# Patient Record
Sex: FEMALE | Race: WHITE | NOT HISPANIC OR LATINO | Employment: OTHER | URBAN - METROPOLITAN AREA
[De-identification: names, ages, dates, MRNs, and addresses within clinical notes are randomized per-mention and may not be internally consistent; named-entity substitution may affect disease eponyms.]

---

## 2018-05-14 ENCOUNTER — OFFICE VISIT CONVERTED (OUTPATIENT)
Dept: SURGERY | Facility: CLINIC | Age: 73
End: 2018-05-14
Attending: PHYSICIAN ASSISTANT

## 2018-05-14 ENCOUNTER — CONVERSION ENCOUNTER (OUTPATIENT)
Dept: SURGERY | Facility: CLINIC | Age: 73
End: 2018-05-14

## 2019-05-13 ENCOUNTER — HOSPITAL ENCOUNTER (OUTPATIENT)
Dept: GENERAL RADIOLOGY | Facility: HOSPITAL | Age: 74
Discharge: HOME | End: 2019-05-13
Attending: FAMILY MEDICINE

## 2019-07-24 ENCOUNTER — OFFICE VISIT CONVERTED (OUTPATIENT)
Dept: ORTHOPEDIC SURGERY | Facility: CLINIC | Age: 74
End: 2019-07-24
Attending: ORTHOPAEDIC SURGERY

## 2020-01-24 ENCOUNTER — HOSPITAL ENCOUNTER (OUTPATIENT)
Dept: URGENT CARE | Facility: CLINIC | Age: 75
Discharge: HOME | End: 2020-01-24
Attending: NURSE PRACTITIONER

## 2021-05-15 VITALS — BODY MASS INDEX: 34.96 KG/M2 | OXYGEN SATURATION: 96 % | WEIGHT: 217.5 LBS | HEART RATE: 94 BPM | HEIGHT: 66 IN

## 2021-05-16 VITALS — BODY MASS INDEX: 35.92 KG/M2 | RESPIRATION RATE: 16 BRPM | WEIGHT: 223.5 LBS | HEIGHT: 66 IN

## 2021-08-18 ENCOUNTER — OFFICE VISIT (OUTPATIENT)
Dept: NEUROSURGERY | Facility: CLINIC | Age: 76
End: 2021-08-18

## 2021-08-18 VITALS
DIASTOLIC BLOOD PRESSURE: 79 MMHG | BODY MASS INDEX: 33.75 KG/M2 | WEIGHT: 210 LBS | HEART RATE: 73 BPM | SYSTOLIC BLOOD PRESSURE: 128 MMHG | HEIGHT: 66 IN

## 2021-08-18 DIAGNOSIS — M48.061 SPINAL STENOSIS, LUMBAR REGION, WITHOUT NEUROGENIC CLAUDICATION: ICD-10-CM

## 2021-08-18 DIAGNOSIS — M43.10 ACQUIRED SPONDYLOLISTHESIS: ICD-10-CM

## 2021-08-18 DIAGNOSIS — M47.817 SPONDYLOSIS OF LUMBOSACRAL REGION WITHOUT MYELOPATHY OR RADICULOPATHY: Primary | ICD-10-CM

## 2021-08-18 PROCEDURE — 99215 OFFICE O/P EST HI 40 MIN: CPT | Performed by: NURSE PRACTITIONER

## 2021-08-18 RX ORDER — MELOXICAM 15 MG/1
15 TABLET ORAL DAILY
Qty: 30 TABLET | Refills: 1 | Status: SHIPPED | OUTPATIENT
Start: 2021-08-18 | End: 2022-08-06

## 2021-08-18 RX ORDER — ESCITALOPRAM OXALATE 20 MG/1
TABLET ORAL
COMMUNITY
Start: 2021-06-11

## 2021-08-18 RX ORDER — LEVOTHYROXINE SODIUM 137 UG/1
TABLET ORAL
COMMUNITY
End: 2022-08-06

## 2021-08-18 RX ORDER — ESOMEPRAZOLE MAGNESIUM 40 MG/1
CAPSULE, DELAYED RELEASE ORAL
COMMUNITY
Start: 2021-06-11

## 2021-08-18 RX ORDER — ASCORBIC ACID 500 MG
TABLET ORAL
COMMUNITY
End: 2022-08-06

## 2021-08-18 RX ORDER — CITALOPRAM 10 MG/1
TABLET ORAL
COMMUNITY
End: 2022-08-06

## 2021-08-18 RX ORDER — MULTIVIT WITH MINERALS/LUTEIN
TABLET ORAL
COMMUNITY
End: 2022-08-06

## 2021-08-18 RX ORDER — LISINOPRIL 5 MG/1
TABLET ORAL
COMMUNITY
Start: 2021-06-11 | End: 2022-09-19 | Stop reason: ALTCHOICE

## 2021-08-18 NOTE — PROGRESS NOTES
"Chief Complaint  Back Pain    Subjective          Celina Randolph who is a 76 y.o. year old female who presents to [facility] NEUROLOGY & NEUROSURGERY for evaluation of her low back pain.    Pt with concerns of low back pain for at least a year. Pain is primarily in the lumbar spine, severe at times. She denies any radiating pain into the legs. Her pain increases with prolonged standing and walking. Resting and bending at the waist relieves her pain. She denies any weakness or numbness in the legs. She denies any problems with bowel or bladder.     She has seen physical therapy and chiropractic, which did not provide much benefit. She is taking Tylenol and a muscle relaxant, which provide modest benefit. She has not seen pain management for any type of medication management or injections.     Her last MRI Lumbar Spine was in October 2020, shows multilevel spondylosis with moderately severe canal stenosis at L4/5.        Recent Interventions: Physical therapy (no benefit), chiropractic (no benefit)      Review of Systems   Musculoskeletal: Positive for arthralgias and back pain.   All other systems reviewed and are negative.       Objective   Vital Signs:   /79   Pulse 73   Ht 167.6 cm (66\")   Wt 95.3 kg (210 lb)   BMI 33.89 kg/m²       Physical Exam  Vitals reviewed.   Constitutional:       Appearance: Normal appearance.   Musculoskeletal:      Lumbar back: Tenderness present. Negative right straight leg raise test and negative left straight leg raise test.      Right hip: No tenderness.      Left hip: No tenderness.   Neurological:      Mental Status: She is alert and oriented to person, place, and time.      Deep Tendon Reflexes: Strength normal.      Reflex Scores:       Patellar reflexes are 1+ on the right side and 1+ on the left side.       Achilles reflexes are 1+ on the right side and 1+ on the left side.       Neurologic Exam     Mental Status   Oriented to person, place, " and time.   Level of consciousness: alert    Motor Exam   Muscle bulk: normal  Overall muscle tone: normal    Strength   Strength 5/5 throughout.     Sensory Exam   Light touch normal.     Gait, Coordination, and Reflexes     Reflexes   Right patellar: 1+  Left patellar: 1+  Right achilles: 1+  Left achilles: 1+       Result Review :       Data reviewed: Radiologic studies personally reviewed MRI Lumbar Spine was in October 2020, shows multilevel spondylosis with moderately severe canal stenosis at L4/5.          Assessment and Plan    Diagnoses and all orders for this visit:    1. Spondylosis of lumbosacral region without myelopathy or radiculopathy (Primary)  -     MRI Lumbar Spine Without Contrast; Future  -     meloxicam (MOBIC) 15 MG tablet; Take 1 tablet by mouth Daily. Take with food.  Dispense: 30 tablet; Refill: 1    2. Spinal stenosis, lumbar region, without neurogenic claudication  -     MRI Lumbar Spine Without Contrast; Future    3. Acquired spondylolisthesis  -     MRI Lumbar Spine Without Contrast; Future    Pt with severe low back pain, worsening over the past year. We reviewed her MRI from a year ago, showing marked stenosis at L4/5 with degenerative changes throughout the spine. She is interested in surgical options. We discussed consideration for back pain would be lumbar fusion, with risks involved due to her age and multilevel spondylosis. We will repeat her MRI Lumbar Spine to evaluate progression for consideration of surgical options. We discussed median nerve blocks and RFA trial. For her pain, we will start Meloxicam 15 mg daily for her pain. She will follow up in 6 weeks to review her MRI.     I spent 50 minutes caring for Celina on this date of service. This time includes time spent by me in the following activities:preparing for the visit, reviewing tests, obtaining and/or reviewing a separately obtained history, performing a medically appropriate examination and/or evaluation ,  counseling and educating the patient/family/caregiver, ordering medications, tests, or procedures, documenting information in the medical record, independently interpreting results and communicating that information with the patient/family/caregiver and care coordination.    Follow Up   Return in about 6 weeks (around 9/29/2021).  Patient was given instructions and counseling regarding her condition or for health maintenance advice.     -MRI Lumbar Spine  -Meloxicam 15 mg daily  -Follow up in 6 weeks on Tuesday or Thursday

## 2021-09-30 ENCOUNTER — OFFICE VISIT (OUTPATIENT)
Dept: NEUROSURGERY | Facility: CLINIC | Age: 76
End: 2021-09-30

## 2021-09-30 ENCOUNTER — LAB (OUTPATIENT)
Dept: LAB | Facility: HOSPITAL | Age: 76
End: 2021-09-30

## 2021-09-30 ENCOUNTER — TRANSCRIBE ORDERS (OUTPATIENT)
Dept: ADMINISTRATIVE | Facility: HOSPITAL | Age: 76
End: 2021-09-30

## 2021-09-30 VITALS — HEIGHT: 66 IN | BODY MASS INDEX: 34.23 KG/M2 | WEIGHT: 213 LBS

## 2021-09-30 DIAGNOSIS — K21.9 CHRONIC GERD: ICD-10-CM

## 2021-09-30 DIAGNOSIS — E78.5 HYPERLIPIDEMIA, UNSPECIFIED HYPERLIPIDEMIA TYPE: ICD-10-CM

## 2021-09-30 DIAGNOSIS — M48.061 SPINAL STENOSIS, LUMBAR REGION, WITHOUT NEUROGENIC CLAUDICATION: ICD-10-CM

## 2021-09-30 DIAGNOSIS — I10 ESSENTIAL HYPERTENSION, MALIGNANT: ICD-10-CM

## 2021-09-30 DIAGNOSIS — F32.A DEPRESSIVE DISORDER: Primary | ICD-10-CM

## 2021-09-30 DIAGNOSIS — E03.9 HYPOTHYROIDISM, UNSPECIFIED TYPE: ICD-10-CM

## 2021-09-30 DIAGNOSIS — F32.A DEPRESSIVE DISORDER: ICD-10-CM

## 2021-09-30 DIAGNOSIS — M47.817 SPONDYLOSIS OF LUMBOSACRAL REGION WITHOUT MYELOPATHY OR RADICULOPATHY: Primary | ICD-10-CM

## 2021-09-30 LAB
ALBUMIN SERPL-MCNC: 4.3 G/DL (ref 3.5–5.2)
ALP SERPL-CCNC: 76 U/L (ref 39–117)
ALT SERPL W P-5'-P-CCNC: 17 U/L (ref 1–33)
ANION GAP SERPL CALCULATED.3IONS-SCNC: 10.2 MMOL/L (ref 5–15)
ANISOCYTOSIS BLD QL: NORMAL
AST SERPL-CCNC: 19 U/L (ref 1–32)
BASOPHILS # BLD MANUAL: 0.07 10*3/MM3 (ref 0–0.2)
BASOPHILS NFR BLD AUTO: 1 % (ref 0–1.5)
BILIRUB CONJ SERPL-MCNC: <0.2 MG/DL (ref 0–0.3)
BILIRUB INDIRECT SERPL-MCNC: NORMAL MG/DL
BILIRUB SERPL-MCNC: 0.3 MG/DL (ref 0–1.2)
BUN SERPL-MCNC: 15 MG/DL (ref 8–23)
BUN/CREAT SERPL: 17.9 (ref 7–25)
CALCIUM SPEC-SCNC: 9.3 MG/DL (ref 8.6–10.5)
CHLORIDE SERPL-SCNC: 104 MMOL/L (ref 98–107)
CHOLEST SERPL-MCNC: 223 MG/DL (ref 0–200)
CO2 SERPL-SCNC: 26.8 MMOL/L (ref 22–29)
CREAT SERPL-MCNC: 0.84 MG/DL (ref 0.57–1)
DEPRECATED RDW RBC AUTO: 39.3 FL (ref 37–54)
EOSINOPHIL # BLD MANUAL: 0.29 10*3/MM3 (ref 0–0.4)
EOSINOPHIL NFR BLD MANUAL: 4 % (ref 0.3–6.2)
ERYTHROCYTE [DISTWIDTH] IN BLOOD BY AUTOMATED COUNT: 12.2 % (ref 12.3–15.4)
FOLATE SERPL-MCNC: 13.7 NG/ML (ref 4.78–24.2)
GFR SERPL CREATININE-BSD FRML MDRD: 66 ML/MIN/1.73
GLUCOSE SERPL-MCNC: 91 MG/DL (ref 65–99)
HCT VFR BLD AUTO: 39.6 % (ref 34–46.6)
HDLC SERPL-MCNC: 67 MG/DL (ref 40–60)
HGB BLD-MCNC: 13 G/DL (ref 12–15.9)
LDLC SERPL CALC-MCNC: 141 MG/DL (ref 0–100)
LDLC/HDLC SERPL: 2.07 {RATIO}
LYMPHOCYTES # BLD MANUAL: 1.75 10*3/MM3 (ref 0.7–3.1)
LYMPHOCYTES NFR BLD MANUAL: 24.2 % (ref 19.6–45.3)
LYMPHOCYTES NFR BLD MANUAL: 5.1 % (ref 5–12)
MCH RBC QN AUTO: 28.9 PG (ref 26.6–33)
MCHC RBC AUTO-ENTMCNC: 32.8 G/DL (ref 31.5–35.7)
MCV RBC AUTO: 88 FL (ref 79–97)
MICROCYTES BLD QL: NORMAL
MONOCYTES # BLD AUTO: 0.37 10*3/MM3 (ref 0.1–0.9)
NEUTROPHILS # BLD AUTO: 4.75 10*3/MM3 (ref 1.7–7)
NEUTROPHILS NFR BLD MANUAL: 65.7 % (ref 42.7–76)
PLAT MORPH BLD: NORMAL
PLATELET # BLD AUTO: 195 10*3/MM3 (ref 140–450)
PMV BLD AUTO: 9.6 FL (ref 6–12)
POIKILOCYTOSIS BLD QL SMEAR: NORMAL
POLYCHROMASIA BLD QL SMEAR: NORMAL
POTASSIUM SERPL-SCNC: 4.2 MMOL/L (ref 3.5–5.2)
PROT SERPL-MCNC: 6.8 G/DL (ref 6–8.5)
RBC # BLD AUTO: 4.5 10*6/MM3 (ref 3.77–5.28)
SODIUM SERPL-SCNC: 141 MMOL/L (ref 136–145)
T4 FREE SERPL-MCNC: 1.39 NG/DL (ref 0.93–1.7)
TRIGL SERPL-MCNC: 86 MG/DL (ref 0–150)
TSH SERPL DL<=0.05 MIU/L-ACNC: 0.34 UIU/ML (ref 0.27–4.2)
VIT B12 BLD-MCNC: 1063 PG/ML (ref 211–946)
VLDLC SERPL-MCNC: 15 MG/DL (ref 5–40)
WBC # BLD AUTO: 7.23 10*3/MM3 (ref 3.4–10.8)
WBC MORPH BLD: NORMAL

## 2021-09-30 PROCEDURE — 36415 COLL VENOUS BLD VENIPUNCTURE: CPT

## 2021-09-30 PROCEDURE — 82746 ASSAY OF FOLIC ACID SERUM: CPT

## 2021-09-30 PROCEDURE — 80048 BASIC METABOLIC PNL TOTAL CA: CPT

## 2021-09-30 PROCEDURE — 82607 VITAMIN B-12: CPT

## 2021-09-30 PROCEDURE — 84443 ASSAY THYROID STIM HORMONE: CPT

## 2021-09-30 PROCEDURE — 80061 LIPID PANEL: CPT

## 2021-09-30 PROCEDURE — 85007 BL SMEAR W/DIFF WBC COUNT: CPT

## 2021-09-30 PROCEDURE — 87086 URINE CULTURE/COLONY COUNT: CPT

## 2021-09-30 PROCEDURE — 85027 COMPLETE CBC AUTOMATED: CPT

## 2021-09-30 PROCEDURE — 84439 ASSAY OF FREE THYROXINE: CPT

## 2021-09-30 PROCEDURE — 81001 URINALYSIS AUTO W/SCOPE: CPT

## 2021-09-30 PROCEDURE — 80076 HEPATIC FUNCTION PANEL: CPT

## 2021-09-30 PROCEDURE — 99214 OFFICE O/P EST MOD 30 MIN: CPT | Performed by: NURSE PRACTITIONER

## 2021-09-30 RX ORDER — TRAMADOL HYDROCHLORIDE 50 MG/1
50 TABLET ORAL EVERY 8 HOURS PRN
Qty: 45 TABLET | Refills: 0 | Status: SHIPPED | OUTPATIENT
Start: 2021-09-30 | End: 2022-08-06

## 2021-09-30 NOTE — PATIENT INSTRUCTIONS
-Referral to pain management for RFA trial  -Tramadol 50 mg every 8 hours as needed for pain  -Stop meloxicam  -Follow up as needed

## 2021-09-30 NOTE — PROGRESS NOTES
"Chief Complaint  Back Pain    Subjective          Celina Randolph who is a 76 y.o. year old female who presents to [facility] NEUROLOGY & NEUROSURGERY for follow up of her low back pain. Review MRI Lumbar Spine.    MRI Lumbar Spine at [facility]. Overall stable multilevel spondylosis with severe canal stenosis at L4/5.     Pt's pain is primarily in the low back, constant and severe. Increases with prolonged walking and standing. She again denies any radiating pain in the legs. Denies numbness or weakness in the legs. Denies problems with bowel or bladder.     Meloxicam did not help and caused diarrhea.         Interval History 8/18/21  Celina Randolph who is a 76 y.o. year old female who presents to [facility] NEUROLOGY & NEUROSURGERY for evaluation of her low back pain.     Pt with concerns of low back pain for at least a year. Pain is primarily in the lumbar spine, severe at times. She denies any radiating pain into the legs. Her pain increases with prolonged standing and walking. Resting and bending at the waist relieves her pain. She denies any weakness or numbness in the legs. She denies any problems with bowel or bladder.      She has seen physical therapy and chiropractic, which did not provide much benefit. She is taking Tylenol and a muscle relaxant, which provide modest benefit. She has not seen pain management for any type of medication management or injections.      Her last MRI Lumbar Spine was in October 2020, shows multilevel spondylosis with moderately severe canal stenosis at L4/5.     Recent Interventions: Physical therapy and chiropractic. Tylenol and meloxicam.      Review of Systems   Musculoskeletal: Positive for back pain.   All other systems reviewed and are negative.       Objective   Vital Signs:   Ht 167.6 cm (66\")   Wt 96.6 kg (213 lb)   BMI 34.38 kg/m²       Physical Exam  Vitals reviewed.   Constitutional:       Appearance: " Normal appearance.   Musculoskeletal:      Lumbar back: Tenderness present. Negative right straight leg raise test and negative left straight leg raise test.   Neurological:      Mental Status: She is alert and oriented to person, place, and time.      Deep Tendon Reflexes: Strength normal.        Neurologic Exam     Mental Status   Oriented to person, place, and time.   Level of consciousness: alert    Motor Exam   Muscle bulk: normal  Overall muscle tone: normal    Strength   Strength 5/5 throughout.     Gait, Coordination, and Reflexes     Gait  Gait: wide-based       Result Review :{Labs  Result Review  Imaging  Med Tab  Media  Procedures :23}       Data reviewed: Radiologic studies MRI Lumbar Spine at [facility]. Overall stable multilevel spondylosis with severe canal stenosis at L4/5.           Assessment and Plan    Diagnoses and all orders for this visit:    1. Spondylosis of lumbosacral region without myelopathy or radiculopathy (Primary)  -     Ambulatory Referral to Pain Management  -     traMADol (ULTRAM) 50 MG tablet; Take 1 tablet by mouth Every 8 (Eight) Hours As Needed for Moderate Pain  or Severe Pain .  Dispense: 45 tablet; Refill: 0    2. Spinal stenosis, lumbar region, without neurogenic claudication    Pt with worsening low back pain over the past year. She has multilevel spondylosis with severe canal stenosis at L4/5. No evidence of neurogenic claudication. Would not recommend surgery at this time. We discussed red flag symptoms that would warrant further follow up. For her pain I will refer her to pain management for lumbar RFA trial. Start Tramadol 50 mg as needed, which can be continued by pain management if beneficial. She will follow up in our clinic on an as needed basis.       Follow Up   Return if symptoms worsen or fail to improve.  Patient was given instructions and counseling regarding her condition or for health maintenance advice.     -Referral to pain management for  RFA trial  -Tramadol 50 mg every 8 hours as needed for pain  -Stop meloxicam  -Follow up as needed

## 2021-10-01 LAB
BACTERIA SPEC AEROBE CULT: NO GROWTH
BACTERIA UR QL AUTO: NORMAL /HPF
BILIRUB UR QL STRIP: NEGATIVE
CLARITY UR: CLEAR
COD CRY URNS QL: NORMAL /HPF
COLOR UR: YELLOW
GLUCOSE UR STRIP-MCNC: NEGATIVE MG/DL
HGB UR QL STRIP.AUTO: NEGATIVE
HYALINE CASTS UR QL AUTO: NORMAL /LPF
KETONES UR QL STRIP: NEGATIVE
LEUKOCYTE ESTERASE UR QL STRIP.AUTO: ABNORMAL
NITRITE UR QL STRIP: NEGATIVE
PH UR STRIP.AUTO: 6.5 [PH] (ref 5–8)
PROT UR QL STRIP: NEGATIVE
RBC # UR: NORMAL /HPF
REF LAB TEST METHOD: NORMAL
SP GR UR STRIP: 1.02 (ref 1–1.03)
SQUAMOUS #/AREA URNS HPF: NORMAL /HPF
UROBILINOGEN UR QL STRIP: ABNORMAL
WBC UR QL AUTO: NORMAL /HPF

## 2022-01-26 ENCOUNTER — TELEPHONE (OUTPATIENT)
Dept: NEUROSURGERY | Facility: CLINIC | Age: 77
End: 2022-01-26

## 2022-01-26 NOTE — TELEPHONE ENCOUNTER
Caller: JARED  Relationship: REFERRING OFFICE.   Best call back number: [phone] (WILL NOT BE IN OFFICE AFTER 2PM TODAY IF NEED TO CONTACT.)      What was the call regarding:   JARED FROM PATIENT REFERRING OFFICE CALLED THEY WOULD LIKE TO KNOW IF THE PATIENT'S LAST TWO OFFICE NOTES FROM  09/30/21 AND 08/18/21 COULD BE FAXED TO THEIR OFFICE -[phone].    PLEASE FAX LAST TWO OFFICE NOTES -[phone]

## 2022-03-16 ENCOUNTER — OFFICE VISIT (OUTPATIENT)
Dept: NEUROSURGERY | Facility: CLINIC | Age: 77
End: 2022-03-16

## 2022-03-16 VITALS
WEIGHT: 218 LBS | SYSTOLIC BLOOD PRESSURE: 153 MMHG | HEIGHT: 66 IN | HEART RATE: 79 BPM | DIASTOLIC BLOOD PRESSURE: 68 MMHG | BODY MASS INDEX: 35.03 KG/M2

## 2022-03-16 DIAGNOSIS — M48.061 SPINAL STENOSIS, LUMBAR REGION, WITHOUT NEUROGENIC CLAUDICATION: ICD-10-CM

## 2022-03-16 DIAGNOSIS — M47.817 SPONDYLOSIS OF LUMBOSACRAL REGION WITHOUT MYELOPATHY OR RADICULOPATHY: Primary | ICD-10-CM

## 2022-03-16 PROCEDURE — 99214 OFFICE O/P EST MOD 30 MIN: CPT | Performed by: NURSE PRACTITIONER

## 2022-03-16 NOTE — PATIENT INSTRUCTIONS
-MRI Lumbar Spine   -Referral to pain management for medication management  -Follow up in 4 weeks on Tuesday or Thursday

## 2022-03-16 NOTE — PROGRESS NOTES
Chief Complaint  Back Pain    Subjective          Celina Randolph who is a 76 y.o. year old female who presents to [facility] NEUROLOGY & NEUROSURGERY for follow up of low back pain.     Pt with history of lumbar spondylosis and spinal stenosis, presenting with concerns of worsening low back pain. At her last visit she was referred to pain management for medication management and lumbar RFA trial. She was started on Tramadol, which did not provide her much benefit. She received lumbar RFA in January of this year, initially appreciating good improvement in her pain though this wore off after a month. Her low back pain is moderate to severe in intensity. At her last visit with pain management she was started on Norco 5/325 mg once daily. This is not providing benefit. Her PCP started low dose Gabapentin 100 mg TID and duloxetine. She is unsure these medications are providing benefit.     Her pain is primarily in the low back. She denies any radiating pain into the legs. Denies weakness into the legs. Denies problems with bowel or bladder.       Interval History 9/30/21  Celina Randolph who is a 76 y.o. year old female who presents to [facility] NEUROLOGY & NEUROSURGERY for follow up of her low back pain. Review MRI Lumbar Spine.     MRI Lumbar Spine at [facility]. Overall stable multilevel spondylosis with severe canal stenosis at L4/5.      Pt's pain is primarily in the low back, constant and severe. Increases with prolonged walking and standing. She again denies any radiating pain in the legs. Denies numbness or weakness in the legs. Denies problems with bowel or bladder.      Meloxicam did not help and caused diarrhea.     Recent Interventions: Lumbar RFA. Norco, Tramadol, Gabapentin, duloxetine, Mobic.       Review of Systems   Musculoskeletal: Positive for arthralgias and back pain.   All other systems reviewed and are negative.       Objective   Vital  "Signs:   /68   Pulse 79   Ht 167.6 cm (66\")   Wt 98.9 kg (218 lb)   BMI 35.19 kg/m²       Physical Exam  Vitals reviewed.   Constitutional:       Appearance: Normal appearance.   Musculoskeletal:      Lumbar back: Tenderness present. Negative right straight leg raise test and negative left straight leg raise test.   Neurological:      Mental Status: She is alert and oriented to person, place, and time.      Deep Tendon Reflexes: Strength normal.      Reflex Scores:       Patellar reflexes are 1+ on the right side and 1+ on the left side.       Achilles reflexes are 1+ on the right side and 1+ on the left side.       Neurologic Exam     Mental Status   Oriented to person, place, and time.   Level of consciousness: alert    Motor Exam   Muscle bulk: normal  Overall muscle tone: normal    Strength   Strength 5/5 throughout.     Sensory Exam   Light touch normal.     Gait, Coordination, and Reflexes     Gait  Gait: wide-based    Reflexes   Right patellar: 1+  Left patellar: 1+  Right achilles: 1+  Left achilles: 1+       Result Review :       Data reviewed: Radiologic studies MRI Lumbar Spine at [facility] in August 2021 personally reviewed, revealing multilevel spondylosis with severe stenosis at L4/5, moderate stenosis at L3/4.           Assessment and Plan    Diagnoses and all orders for this visit:    1. Spondylosis of lumbosacral region without myelopathy or radiculopathy (Primary)  -     Cancel: MRI Lumbar Spine Without Contrast; Future  -     MRI Lumbar Spine Without Contrast; Future  -     Ambulatory Referral to Pain Management    2. Spinal stenosis, lumbar region, without neurogenic claudication  -     Cancel: MRI Lumbar Spine Without Contrast; Future  -     MRI Lumbar Spine Without Contrast; Future  -     Ambulatory Referral to Pain Management    Pt presenting with worsening low back pain. She has failed conservative therapies including PT, medication management, and most recently lumbar RFA. " We have discussed previously that with her pain primarily in the back surgery would likely not provide much improvement in her pain. She wishes to discuss surgical options with Dr. Moss. Will repeat MRI Lumbar Spine and follow up in 6 weeks for surgical evaluation. We did discuss consideration for spinal cord stimulator. She is requesting medication for pain today. We discussed that she is followed by pain management and we would not manage her medications. She requests a referral to a different pain management specialist for medication management.       Follow Up   Return in about 4 weeks (around 4/13/2022).  Patient was given instructions and counseling regarding her condition or for health maintenance advice.     -MRI Lumbar Spine   -Referral to pain management for medication management  -Follow up in 4 weeks on Tuesday or Thursday

## 2022-03-25 ENCOUNTER — TELEPHONE (OUTPATIENT)
Dept: NEUROLOGY | Facility: CLINIC | Age: 77
End: 2022-03-25

## 2022-03-25 NOTE — TELEPHONE ENCOUNTER
Caller: NANCIE    Relationship: [facility]     What orders are you requesting (i.e. lab or imaging): MRI LUMBAR SPINE WO CONTRAST     NANCIE WITH [facility] IS REQUESTING IM ORDER BE FAXED TO HER BEFORE EOD. PATIENT IS SCHEDULED FOR Monday 3/28/22.     FAX # [phone]    THANK YOU.

## 2022-08-06 PROCEDURE — U0005 INFEC AGEN DETEC AMPLI PROBE: HCPCS | Performed by: FAMILY MEDICINE

## 2022-08-06 PROCEDURE — U0004 COV-19 TEST NON-CDC HGH THRU: HCPCS | Performed by: FAMILY MEDICINE

## 2022-08-08 ENCOUNTER — TELEPHONE (OUTPATIENT)
Dept: URGENT CARE | Facility: CLINIC | Age: 77
End: 2022-08-08

## 2022-08-09 ENCOUNTER — TELEPHONE (OUTPATIENT)
Dept: URGENT CARE | Facility: CLINIC | Age: 77
End: 2022-08-09

## 2022-09-19 ENCOUNTER — OFFICE VISIT (OUTPATIENT)
Dept: PULMONOLOGY | Facility: CLINIC | Age: 77
End: 2022-09-19

## 2022-09-19 VITALS
RESPIRATION RATE: 16 BRPM | BODY MASS INDEX: 49.48 KG/M2 | DIASTOLIC BLOOD PRESSURE: 72 MMHG | TEMPERATURE: 98 F | HEART RATE: 79 BPM | HEIGHT: 55 IN | SYSTOLIC BLOOD PRESSURE: 144 MMHG | WEIGHT: 213.8 LBS | OXYGEN SATURATION: 96 %

## 2022-09-19 DIAGNOSIS — R09.82 POST-NASAL DRIP: ICD-10-CM

## 2022-09-19 DIAGNOSIS — R05.3 CHRONIC COUGH: Primary | ICD-10-CM

## 2022-09-19 PROCEDURE — 99204 OFFICE O/P NEW MOD 45 MIN: CPT | Performed by: INTERNAL MEDICINE

## 2022-09-19 RX ORDER — ALBUTEROL SULFATE 90 UG/1
2 AEROSOL, METERED RESPIRATORY (INHALATION) EVERY 4 HOURS PRN
Qty: 1 G | Refills: 5 | Status: SHIPPED | OUTPATIENT
Start: 2022-09-19

## 2022-09-19 RX ORDER — AZELASTINE HCL 205.5 UG/1
2 SPRAY NASAL 2 TIMES DAILY
Qty: 1 EACH | Refills: 6 | Status: SHIPPED | OUTPATIENT
Start: 2022-09-19

## 2022-09-19 RX ORDER — FEXOFENADINE HYDROCHLORIDE 180 MG/1
TABLET, FILM COATED ORAL
COMMUNITY
Start: 2022-07-26

## 2022-09-19 RX ORDER — MELOXICAM 7.5 MG/1
TABLET ORAL
COMMUNITY
Start: 2022-07-26

## 2022-09-19 RX ORDER — THYROID,PORK 15 MG
TABLET ORAL
COMMUNITY
Start: 2022-07-29

## 2022-09-19 NOTE — PROGRESS NOTES
CHIEF COMPLAINT    Primary Care Provider  Obie Del Cid DO     Referring Provider  No ref. provider found    Patient Complaint  Establish Care (New pt here for chronic cough ), Cough, and Wheezing        Subjective          Celina Randolph presents to [facility] PULMONARY & CRITICAL CARE MEDICINE  History of Present Illness  Celina Randolph is a 77 y.o. female who has chronic cough.  She was referred to me by primary care provider.  She has been having cough for several years, has usually dry cough.  She has been having intermittent mucoid phlegm production.  Usually she has coughing fits at unknown times.  She also has postnasal drip and has been having issues trying to clear her throat at times.  She never had work-up including pulmonary function test and 6-minute walk test.  Never had chest x-ray.  She was recently diagnosed with COVID-19 infection and her cough was worse since.  She has been on lisinopril at home and feels like with her it has caused a cough.  She has been on it for more than 4 years.  She has no changes in weight or appetite.  She is a lifelong non-smoker.  However she had secondhand smoke exposure from her  who recently passed away.  No history of asthma.  No history of chronic lung disease in family.  No changes in weight or appetite.  She has been given Tussionex which does help her with cough at night but makes her drowsy.  At times, she has cough so bad where she almost passes out.  She at times cannot speak with persistent cough.  Her  used to tease her that she is not able to speak with profound cough.  She has not been on any inhalers.    Tobacco Use: Low Risk    • Smoking Tobacco Use: Never Smoker   • Smokeless Tobacco Use: Never Used   .    Review of Systems     General:  No Fatigue, No Fever No weight loss or loss of appetite  HEENT: No dysphagia, No Visual Changes, no rhinorrhea  Respiratory:  + cough,+Dyspnea, minimal intermittent  phlegm, No Pleuritic Pain, no wheezing, no hemoptysis.  Cardiovascular: Denies chest pain, denies palpitations,+JACOBSON, No Chest Pressure  Gastrointestinal:  No Abdominal Pain, No Nausea, No Vomiting, No Diarrhea  Genitourinary:  No Dysuria, No Frequency, No Hesitancy  Musculoskeletal: No muscle pain or swelling  Endocrine:  No Heat Intolerance, No Cold Intolerance  Hematologic:  No Bleeding, No Bruising  Psychiatric:  No Anxiety, No Depression  Neurologic:  No Confusion, no Dysarthria, No Headaches  Skin:  No Rash, No Open Wounds    History reviewed. No pertinent family history.     Social History     Socioeconomic History   • Marital status:    Tobacco Use   • Smoking status: Never Smoker   • Smokeless tobacco: Never Used   Vaping Use   • Vaping Use: Never used   Substance and Sexual Activity   • Alcohol use: Never   • Drug use: Never   • Sexual activity: Defer        Past Medical History:   Diagnosis Date   • Headache    • Hypertension    • Low back pain         Immunization History   Administered Date(s) Administered   • COVID-19 (Chideo) 03/09/2021         Allergies   Allergen Reactions   • Cephalexin Other (See Comments), Itching, Rash and Swelling     Neck swelling  Neck swelling     • Penicillins Other (See Comments), Itching, Rash and Swelling     Throat swelling  Throat swelling     • Cortisone Other (See Comments), Itching and Swelling     Able to use topical, NO INJECTION or PO; allergic to ALL STEROIDS  Able to use topical, NO INJECTION or PO; allergic to ALL STEROIDS            Current Outpatient Medications:   •  [place] Thyroid 15 MG tablet, , Disp: , Rfl:   •  escitalopram (LEXAPRO) 20 MG tablet, , Disp: , Rfl:   •  esomeprazole (nexIUM) 40 MG capsule, , Disp: , Rfl:   •  HYDROcod Polst-CPM Polst ER (TUSSIONEX PENNKINETIC) 10-8 MG/5ML ER suspension, TAKE 2.5 TO 5ml BY MOUTH AT BEDTIME OR EVERY 12 Hours AS NEEDED for cough, Disp: , Rfl:   •  Thyroid 60 MG PO tablet, Take 15 mg by mouth Daily.,  "Disp: , Rfl:   •  albuterol sulfate  (90 Base) MCG/ACT inhaler, Inhale 2 puffs Every 4 (Four) Hours As Needed for Wheezing., Disp: 1 g, Rfl: 5  •  Allergy Relief 180 MG tablet, TAKE ONE TABLET BY MOUTH EVERY DAY for ALLERGY symptoms, Disp: , Rfl:   •  azelastine (ASTEPRO) 0.15 % solution nasal spray, 2 sprays into the nostril(s) as directed by provider 2 (Two) Times a Day., Disp: 1 each, Rfl: 6  •  HYDROcodone-acetaminophen (NORCO) 5-325 MG per tablet, hydrocodone 5 mg-acetaminophen 325 mg tablet  TAKE ONE tablet every DAY by oral route for 30 DAYS.], Disp: , Rfl:   •  meloxicam (MOBIC) 7.5 MG tablet, TAKE ONE TABLET BY MOUTH EVERY EVENING WITH food for arthritis PAIN. keep seperated from lexapro, Disp: , Rfl:   •  metoprolol tartrate (LOPRESSOR) 25 MG tablet, Take 1 tablet by mouth 2 (Two) Times a Day., Disp: 60 tablet, Rfl: 3  •  sulfamethoxazole-trimethoprim (BACTRIM DS,SEPTRA DS) 800-160 MG per tablet, sulfamethoxazole 800 mg-trimethoprim 160 mg tablet  TAKE 1 TABLET BY MOUTH TWICE DAILY FOR 10 DAYS, Disp: , Rfl:      Objective   Vital Signs:   /72 (BP Location: Left arm, Patient Position: Sitting, Cuff Size: Adult)   Pulse 79   Temp 98 °F (36.7 °C) (Tympanic)   Resp 16   Ht 66 cm (25.98\")   Wt 97 kg (213 lb 12.8 oz)   SpO2 96% Comment: Room air  .64 kg/m²   Mallampatti classification : 1  Physical Exam      Vital Signs Reviewed  Pleasant elderly female, no acute distress, normal conversational   HEENT:  PERRL, EOMI.  OP, nares clear, no sinus tenderness  Neck:  Supple, no JVD, no thyromegaly  Lymph: no axillary, cervical, supraclavicular lymphadenopathy noted bilaterally  Chest:  good aeration, bilateral diminished breath sounds, no wheezing, crackles or rhonchi, resonant to percussion b/l  CV: RRR, no MGR, pulses 2+, equal.  Abd:  Soft, NT, ND, + BS, no HSM  EXT:  no clubbing, no cyanosis, No BLE edema  Neuro:  A&Ox3, CN grossly intact, no focal deficits.  Skin: No rashes or lesions " noted     Result Review :   The following data was reviewed by: Randy Dowell MD on 09/19/2022:  Common labs    Common Labsle 9/30/21 9/30/21 9/30/21 9/30/21    1001 1001 1001 1001   Glucose  91     BUN  15     Creatinine  0.84     eGFR Non African Am  66     Sodium  141     Potassium  4.2     Chloride  104     Calcium  9.3     Albumin    4.30   Total Bilirubin    0.3   Alkaline Phosphatase    76   AST (SGOT)    19   ALT (SGPT)    17   WBC 7.23      Hemoglobin 13.0      Hematocrit 39.6      Platelets 195      Total Cholesterol   223 (A)    Triglycerides   86    HDL Cholesterol   67 (A)    LDL Cholesterol    141 (A)    (A) Abnormal value            CMP    CMP 9/30/21 9/30/21    1001 1001   Glucose 91    BUN 15    Creatinine 0.84    eGFR Non African Am 66    Sodium 141    Potassium 4.2    Chloride 104    Calcium 9.3    Albumin  4.30   Total Bilirubin  0.3   Alkaline Phosphatase  76   AST (SGOT)  19   ALT (SGPT)  17           CBC    CBC 9/30/21   WBC 7.23   RBC 4.50   Hemoglobin 13.0   Hematocrit 39.6   MCV 88.0   MCH 28.9   MCHC 32.8   RDW 12.2 (A)   Platelets 195   (A) Abnormal value            CBC w/diff    CBC w/Diff 9/30/21   WBC 7.23   RBC 4.50   Hemoglobin 13.0   Hematocrit 39.6   MCV 88.0   MCH 28.9   MCHC 32.8   RDW 12.2 (A)   Platelets 195   (A) Abnormal value            Data reviewed: Radiologic studies Previous imaging report reviewed.  Chest x-ray from last year shows no acute intra thoracic abnormality.            Assessment and Plan    Diagnoses and all orders for this visit:    1. Chronic cough (Primary)  -     6 Minute Walk Test; Future  -     Pulmonary Function Test; Future  -     IgE Level; Future  -     azelastine (ASTEPRO) 0.15 % solution nasal spray; 2 sprays into the nostril(s) as directed by provider 2 (Two) Times a Day.  Dispense: 1 each; Refill: 6  -     albuterol sulfate  (90 Base) MCG/ACT inhaler; Inhale 2 puffs Every 4 (Four) Hours As Needed for Wheezing.  Dispense: 1 g; Refill:  5  -     XR Chest 2 View; Future  -     metoprolol tartrate (LOPRESSOR) 25 MG tablet; Take 1 tablet by mouth 2 (Two) Times a Day.  Dispense: 60 tablet; Refill: 3    2. Post-nasal drip  -     6 Minute Walk Test; Future  -     Pulmonary Function Test; Future  -     IgE Level; Future  -     azelastine (ASTEPRO) 0.15 % solution nasal spray; 2 sprays into the nostril(s) as directed by provider 2 (Two) Times a Day.  Dispense: 1 each; Refill: 6  -     albuterol sulfate  (90 Base) MCG/ACT inhaler; Inhale 2 puffs Every 4 (Four) Hours As Needed for Wheezing.  Dispense: 1 g; Refill: 5  -     XR Chest 2 View; Future  -     metoprolol tartrate (LOPRESSOR) 25 MG tablet; Take 1 tablet by mouth 2 (Two) Times a Day.  Dispense: 60 tablet; Refill: 3      Chronic cough: Usually dry and has postnasal drip.  Am concerned that it is multifactorial including ACE inhibitor use along with postnasal drip and some component of gastroesophageal reflux disease.  Advised her to continue with Nexium daily.  I will start her on albuterol as needed.  I will stop her lisinopril.  Start metoprolol 25 mg twice daily for now.  Check pulmonary function test and 6-minute walk test.  Check serum IgE level.    Postnasal drip: Start azelastine nasal spray 2 sprays daily.  Continue Flonase 2 spray in each nose twice daily.    Patient had first dose of COVID-19 vaccine last year but has not taken second dose.  She recently was diagnosed with COVID-19 infection.  Her cough definitely has got worse since COVID-19 infection.    Follow Up   Return in about 3 months (around 12/19/2022).  Patient was given instructions and counseling regarding her condition or for health maintenance advice. Please see specific information pulled into the AVS if appropriate.        Electronically signed by Randy Dowell MD, 9/19/2022, 09:11 EDT.

## 2022-09-20 ENCOUNTER — LAB (OUTPATIENT)
Dept: LAB | Facility: HOSPITAL | Age: 77
End: 2022-09-20

## 2022-09-20 ENCOUNTER — HOSPITAL ENCOUNTER (OUTPATIENT)
Dept: GENERAL RADIOLOGY | Facility: HOSPITAL | Age: 77
Discharge: HOME | End: 2022-09-20

## 2022-09-20 DIAGNOSIS — R05.3 CHRONIC COUGH: ICD-10-CM

## 2022-09-20 DIAGNOSIS — R09.82 POST-NASAL DRIP: ICD-10-CM

## 2022-09-20 PROCEDURE — 82785 ASSAY OF IGE: CPT

## 2022-09-20 PROCEDURE — 71046 X-RAY EXAM CHEST 2 VIEWS: CPT

## 2022-09-20 PROCEDURE — 36415 COLL VENOUS BLD VENIPUNCTURE: CPT

## 2022-09-21 LAB — IGE SERPL-ACNC: 15.2 KU/L

## 2022-12-13 ENCOUNTER — APPOINTMENT (OUTPATIENT)
Dept: RESPIRATORY THERAPY | Facility: HOSPITAL | Age: 77
End: 2022-12-13

## 2022-12-13 ENCOUNTER — APPOINTMENT (OUTPATIENT)
Dept: CARDIAC REHAB | Facility: HOSPITAL | Age: 77
End: 2022-12-13

## 2023-04-24 ENCOUNTER — TRANSCRIBE ORDERS (OUTPATIENT)
Dept: ADMINISTRATIVE | Facility: HOSPITAL | Age: 78
End: 2023-04-24
Payer: MEDICARE

## 2023-04-24 ENCOUNTER — HOSPITAL ENCOUNTER (OUTPATIENT)
Dept: CARDIOLOGY | Facility: HOSPITAL | Age: 78
Setting detail: HOSPITAL OUTPATIENT SURGERY
Discharge: HOME | End: 2023-04-24
Admitting: FAMILY MEDICINE
Payer: MEDICARE

## 2023-04-24 ENCOUNTER — OFFICE VISIT (OUTPATIENT)
Dept: OTOLARYNGOLOGY | Facility: CLINIC | Age: 78
End: 2023-04-24
Payer: MEDICARE

## 2023-04-24 VITALS
BODY MASS INDEX: 34.69 KG/M2 | WEIGHT: 221 LBS | HEART RATE: 64 BPM | DIASTOLIC BLOOD PRESSURE: 80 MMHG | TEMPERATURE: 98 F | HEIGHT: 67 IN | SYSTOLIC BLOOD PRESSURE: 130 MMHG

## 2023-04-24 DIAGNOSIS — J34.89 NASAL CRUSTING: Primary | ICD-10-CM

## 2023-04-24 DIAGNOSIS — R07.9 CHEST PAIN, UNSPECIFIED TYPE: Primary | ICD-10-CM

## 2023-04-24 DIAGNOSIS — I10 ESSENTIAL HYPERTENSION, MALIGNANT: ICD-10-CM

## 2023-04-24 DIAGNOSIS — J01.90 ACUTE SINUSITIS, RECURRENCE NOT SPECIFIED, UNSPECIFIED LOCATION: ICD-10-CM

## 2023-04-24 LAB — QT INTERVAL: 427 MS

## 2023-04-24 PROCEDURE — 93005 ELECTROCARDIOGRAM TRACING: CPT

## 2023-04-24 RX ORDER — CIPROFLOXACIN 500 MG/1
500 TABLET, FILM COATED ORAL EVERY 12 HOURS SCHEDULED
Qty: 20 TABLET | Refills: 0 | Status: SHIPPED | OUTPATIENT
Start: 2023-04-24 | End: 2023-05-04

## 2023-04-24 RX ORDER — FLUOCINOLONE ACETONIDE 0.11 MG/ML
OIL AURICULAR (OTIC)
COMMUNITY
Start: 2023-04-21

## 2023-04-24 RX ORDER — LEVOTHYROXINE SODIUM 137 UG/1
137 TABLET ORAL EVERY MORNING
COMMUNITY
Start: 2023-04-21
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
## 2023-05-16 LAB — QT INTERVAL: 427 MS

## 2023-09-14 NOTE — PROGRESS NOTES
Chief Complaint: Urinary Incontinence    Subjective         History of Present Illness  Celina Randolph is a 78 y.o. female presents to [facility] to be seen for incontinence.    Patient presents reporting she is having difficulty with incontinence for the past year. She also reports she is having pain in her lower back that has been ongoing daily. She does have arthritis in her back. She reports at times she does feel pressure that goes around to her suprapubic area.     Has not been treated for OAB.     Frequency- admits    Urgency- admits    Incontinence- admits with urgency, at times with cough/laugh/sneeze    Nocturia- 2    Dysuria- denies    GH- denies    History of infections- denies    History of stones- denies     surgeries- denies    Family history of  malignancy- denies    Cardiopulmonary- denies    Anticoagulants- denies    Smoker- denies    Objective     Past Medical History:   Diagnosis Date    Arthritis     Headache     Hypertension     Low back pain        Past Surgical History:   Procedure Laterality Date    CATARACT EXTRACTION Bilateral     GALLBLADDER SURGERY      HEMORROIDECTOMY      HIATAL HERNIA REPAIR      HYSTERECTOMY      OTHER SURGICAL HISTORY      Knee, torn menicus    ROTATOR CUFF REPAIR Right          Current Outpatient Medications:     albuterol sulfate  (90 Base) MCG/ACT inhaler, Inhale 2 puffs Every 4 (Four) Hours As Needed for Wheezing., Disp: 1 g, Rfl: 5    azelastine (ASTEPRO) 0.15 % solution nasal spray, 2 sprays into the nostril(s) as directed by provider 2 (Two) Times a Day., Disp: 1 each, Rfl: 6    clindamycin-benzoyl peroxide (BENZACLIN) 1-5 % gel, Apply 1 application  topically to the appropriate area as directed 2 (Two) Times a Day., Disp: , Rfl:     DULoxetine HCl (DRIZALMA) 20 MG capsule, Take 1 capsule by mouth Daily., Disp: , Rfl:     escitalopram (LEXAPRO) 20 MG tablet, , Disp: , Rfl:     esomeprazole (nexIUM) 40 MG capsule, ,  "Disp: , Rfl:     fluocinolone acetonide (DERMOTIC) 0.01 % oil otic oil, Use ONE to TWO drops IN affected ear EVERY DAY or TWICE DAILY AS NEEDED itching, Disp: , Rfl:     HYDROcodone-acetaminophen (NORCO) 5-325 MG per tablet, hydrocodone 5 mg-acetaminophen 325 mg tablet  TAKE ONE tablet every DAY by oral route for 30 DAYS.], Disp: , Rfl:     levothyroxine (SYNTHROID, LEVOTHROID) 137 MCG tablet, Take 1 tablet by mouth Every Morning., Disp: , Rfl:     lisinopril (PRINIVIL,ZESTRIL) 5 MG tablet, Take 1 tablet by mouth Daily., Disp: , Rfl:     meloxicam (MOBIC) 7.5 MG tablet, TAKE ONE TABLET BY MOUTH EVERY EVENING WITH food for arthritis PAIN. keep seperated from lexapro, Disp: , Rfl:     naproxen (NAPROSYN) 500 MG tablet, Take 1 tablet by mouth 2 (Two) Times a Day With Meals., Disp: , Rfl:     traMADol (ULTRAM) 50 MG tablet, Take 1 tablet by mouth Every 12 (Twelve) Hours As Needed., Disp: , Rfl:     Mirabegron ER (Myrbetriq) 25 MG tablet sustained-release 24 hour 24 hr tablet, Take 1 tablet by mouth Daily., Disp: 30 tablet, Rfl: 3    Allergies   Allergen Reactions    Cephalexin Other (See Comments), Itching, Rash and Swelling     Neck swelling  Neck swelling      Penicillins Other (See Comments), Itching, Rash and Swelling     Throat swelling  Throat swelling      Cortisone Other (See Comments), Itching and Swelling     Able to use topical, NO INJECTION or PO; allergic to ALL STEROIDS  Able to use topical, NO INJECTION or PO; allergic to ALL STEROIDS          History reviewed. No pertinent family history.    Social History     Socioeconomic History    Marital status:    Tobacco Use    Smoking status: Never     Passive exposure: Never    Smokeless tobacco: Never   Vaping Use    Vaping Use: Never used   Substance and Sexual Activity    Alcohol use: Never    Drug use: Never    Sexual activity: Defer       Vital Signs:   Resp 16   Ht 168.9 cm (66.5\")   Wt 100 kg (221 lb 3.2 oz)   BMI 35.17 kg/m²      Physical " Exam  Vitals reviewed.   Constitutional:       Appearance: Normal appearance.   Neurological:      General: No focal deficit present.      Mental Status: She is alert and oriented to person, place, and time.   Psychiatric:         Mood and Affect: Mood normal.         Behavior: Behavior normal.        Result Review :   The following data was reviewed by: JAMMIE Elliott on 09/18/2023:      Results for orders placed or performed in visit on 09/18/23   Bladder Scan   Result Value Ref Range    Urine Volume 0        Bladder Scan interpretation 09/18/2023    Estimation of residual urine via BVI 3000 Verathon Bladder Scan  MA/nurse performing: Shefali RUVALCABA MA  Residual Urine: 0 ml  Indication: Urinary incontinence, unspecified type    Overactive bladder   Position: Supine  Examination: Incremental scanning of the suprapubic area using 2.0 MHz transducer using copious amounts of acoustic gel.   Findings: An anechoic area was demonstrated which represented the bladder, with measurement of residual urine as noted. I inspected this myself. In that the residual urine was  insignificant, refer to plan for treatment and plan necessary at this time.                Procedures        Assessment and Plan    Diagnoses and all orders for this visit:    1. Urinary incontinence, unspecified type (Primary)  -     Bladder Scan  -     Mirabegron ER (Myrbetriq) 25 MG tablet sustained-release 24 hour 24 hr tablet; Take 1 tablet by mouth Daily.  Dispense: 30 tablet; Refill: 3    2. Overactive bladder  -     Mirabegron ER (Myrbetriq) 25 MG tablet sustained-release 24 hour 24 hr tablet; Take 1 tablet by mouth Daily.  Dispense: 30 tablet; Refill: 3    Overactive bladder-discussed with patient at length, all questions addressed. Discussed with patient that urinary urgency and frequency due to overactive bladder is a common condition that is multifactorial in nature, frequently difficult to treat, unlikely to cure, and management is  dictated by patient motivation to improve and cope with symptoms.    - Initiate first-line conservative therapy; behavioral modifications including bladder training via timed and double voiding; fluid management, limiting fluids prior to sleep, and voiding immediately prior to sleep.   -Patient also interested in trial of medication for OAB.    Given her age, I will start her on Myrbetriq to see if this is effective for her OAB symptoms.    I will see her back in 6 weeks or sooner for new concerns.    Follow Up   Return in about 6 weeks (around 10/30/2023) for with PVR.  Patient was given instructions and counseling regarding her condition or for health maintenance advice. Please see specific information pulled into the AVS if appropriate.         This document has been electronically signed by JAMMIE Elliott  September 18, 2023 10:29 EDT

## 2023-09-18 ENCOUNTER — OFFICE VISIT (OUTPATIENT)
Dept: UROLOGY | Facility: CLINIC | Age: 78
End: 2023-09-18
Payer: MEDICARE

## 2023-09-18 VITALS — RESPIRATION RATE: 16 BRPM | HEIGHT: 66 IN | WEIGHT: 221.2 LBS | BODY MASS INDEX: 35.55 KG/M2

## 2023-09-18 DIAGNOSIS — R32 URINARY INCONTINENCE, UNSPECIFIED TYPE: Primary | ICD-10-CM

## 2023-09-18 DIAGNOSIS — N32.81 OVERACTIVE BLADDER: ICD-10-CM

## 2023-09-18 LAB — URINE VOLUME: 0

## 2023-09-18 PROCEDURE — 99203 OFFICE O/P NEW LOW 30 MIN: CPT | Performed by: NURSE PRACTITIONER

## 2023-09-18 PROCEDURE — 1159F MED LIST DOCD IN RCRD: CPT | Performed by: NURSE PRACTITIONER

## 2023-09-18 PROCEDURE — 1160F RVW MEDS BY RX/DR IN RCRD: CPT | Performed by: NURSE PRACTITIONER

## 2023-09-18 RX ORDER — LISINOPRIL 5 MG/1
5 TABLET ORAL DAILY
COMMUNITY

## 2023-09-18 RX ORDER — NAPROXEN 500 MG/1
500 TABLET ORAL 2 TIMES DAILY WITH MEALS
COMMUNITY
Start: 2023-06-28

## 2023-09-18 RX ORDER — TRAMADOL HYDROCHLORIDE 50 MG/1
50 TABLET ORAL EVERY 12 HOURS PRN
COMMUNITY
Start: 2023-07-25

## 2023-09-18 RX ORDER — CLINDAMYCIN AND BENZOYL PEROXIDE 10; 50 MG/G; MG/G
1 GEL TOPICAL 2 TIMES DAILY
COMMUNITY
Start: 2023-07-12

## 2023-09-21 ENCOUNTER — PATIENT ROUNDING (BHMG ONLY) (OUTPATIENT)
Dept: SURGERY | Facility: CLINIC | Age: 78
End: 2023-09-21
Payer: MEDICARE

## 2023-09-21 NOTE — PROGRESS NOTES
September 21, 2023    Hello, may I speak with Celina Randolph?    My name is DI CREWS      I am  with [facility]  [facility]  [address]  [place] [address] [phone].    Before we get started may I verify your date of birth? 1945    I am calling to officially welcome you to our practice and ask about your recent visit. Is this a good time to talk? yes    Tell me about your visit with us. What things went well?  PATIENT STATED HER EXPERIENCE WAS GREAT. SHE SAID GISSEL AND JAKE WERE VERY NICE.       We're always looking for ways to make our patients' experiences even better. Do you have recommendations on ways we may improve?  no    Overall were you satisfied with your first visit to our practice? yes       I appreciate you taking the time to speak with me today. Is there anything else I can do for you? no      Thank you, and have a great day.       Complex Repair And Double M Plasty Text: The defect edges were debeveled with a #15 scalpel blade.  The primary defect was closed partially with a complex linear closure.  Given the location of the remaining defect, shape of the defect and the proximity to free margins a double M plasty was deemed most appropriate for complete closure of the defect.  Using a sterile surgical marker, an appropriate advancement flap was drawn incorporating the defect and placing the expected incisions within the relaxed skin tension lines where possible.    The area thus outlined was incised deep to adipose tissue with a #15 scalpel blade.  The skin margins were undermined to an appropriate distance in all directions utilizing iris scissors.

## 2023-10-30 ENCOUNTER — TELEPHONE (OUTPATIENT)
Dept: UROLOGY | Facility: CLINIC | Age: 78
End: 2023-10-30

## 2023-11-20 ENCOUNTER — TRANSCRIBE ORDERS (OUTPATIENT)
Dept: ADMINISTRATIVE | Facility: HOSPITAL | Age: 78
End: 2023-11-20
Payer: MEDICARE

## 2023-11-20 DIAGNOSIS — M54.50 LUMBAR PAIN: Primary | ICD-10-CM

## 2023-11-20 DIAGNOSIS — M79.605 LOW BACK PAIN RADIATING TO LEFT LOWER EXTREMITY: ICD-10-CM

## 2023-11-20 DIAGNOSIS — M54.50 LOW BACK PAIN RADIATING TO LEFT LOWER EXTREMITY: ICD-10-CM

## 2023-12-21 ENCOUNTER — APPOINTMENT (OUTPATIENT)
Dept: GENERAL RADIOLOGY | Facility: HOSPITAL | Age: 78
End: 2023-12-21
Payer: MEDICARE

## 2023-12-21 ENCOUNTER — HOSPITAL ENCOUNTER (OUTPATIENT)
Dept: GENERAL RADIOLOGY | Facility: HOSPITAL | Age: 78
Discharge: HOME | End: 2023-12-21
Payer: MEDICARE

## 2023-12-21 ENCOUNTER — TRANSCRIBE ORDERS (OUTPATIENT)
Dept: ADMINISTRATIVE | Facility: HOSPITAL | Age: 78
End: 2023-12-21
Payer: MEDICARE

## 2023-12-21 ENCOUNTER — HOSPITAL ENCOUNTER (OUTPATIENT)
Dept: MRI IMAGING | Facility: HOSPITAL | Age: 78
Discharge: HOME | End: 2023-12-21
Payer: MEDICARE

## 2023-12-21 DIAGNOSIS — M25.551 HIP PAIN, ACUTE, RIGHT: ICD-10-CM

## 2023-12-21 DIAGNOSIS — M54.50 LOW BACK PAIN RADIATING TO LEFT LOWER EXTREMITY: ICD-10-CM

## 2023-12-21 DIAGNOSIS — M54.50 LUMBAR PAIN: ICD-10-CM

## 2023-12-21 DIAGNOSIS — M79.605 LOW BACK PAIN RADIATING TO LEFT LOWER EXTREMITY: ICD-10-CM

## 2023-12-21 DIAGNOSIS — M25.551 HIP PAIN, ACUTE, RIGHT: Primary | ICD-10-CM

## 2023-12-21 PROCEDURE — 72100 X-RAY EXAM L-S SPINE 2/3 VWS: CPT

## 2023-12-21 PROCEDURE — 73502 X-RAY EXAM HIP UNI 2-3 VIEWS: CPT

## 2023-12-21 PROCEDURE — 72148 MRI LUMBAR SPINE W/O DYE: CPT

## 2024-01-18 ENCOUNTER — OFFICE VISIT (OUTPATIENT)
Dept: NEUROSURGERY | Facility: CLINIC | Age: 79
End: 2024-01-18
Payer: MEDICARE

## 2024-01-18 VITALS
HEIGHT: 67 IN | SYSTOLIC BLOOD PRESSURE: 143 MMHG | WEIGHT: 213.1 LBS | BODY MASS INDEX: 33.45 KG/M2 | DIASTOLIC BLOOD PRESSURE: 61 MMHG | HEART RATE: 73 BPM

## 2024-01-18 DIAGNOSIS — M48.061 SPINAL STENOSIS, LUMBAR REGION, WITHOUT NEUROGENIC CLAUDICATION: ICD-10-CM

## 2024-01-18 DIAGNOSIS — M47.27 OSTEOARTHRITIS OF SPINE WITH RADICULOPATHY, LUMBOSACRAL REGION: Primary | ICD-10-CM

## 2024-01-18 NOTE — PROGRESS NOTES
Chief Complaint  Back Pain (Right hip pain into leg, stopping at ankle. )    Subjective          Celina Randolph who is a 78 y.o. year old female who presents to [facility] for follow up. Worsening pain.     History of Present Illness  Pt with concerns of low back pain for several years. Since last year she has developed pain into the right leg, the entirety of the leg stopping at the ankle. Her pain is constant. She has concerns of weakness in the leg. She has been using a walker and wheelchair as she is not able to walk any distance due to her pain.     She underwent lumbar RFA with pain management which only provided a short duration of relief.     She is currently taking Norco and a muscle relaxant for her pain, which provide modest benefit.     She does not have any significant heart or lung history. She is not diabetic.       Interval History   Celina Randolph who is a 76 y.o. year old female who presents to [facility] for follow up of low back pain.      Pt with history of lumbar spondylosis and spinal stenosis, presenting with concerns of worsening low back pain. At her last visit she was referred to pain management for medication management and lumbar RFA trial. She was started on Tramadol, which did not provide her much benefit. She received lumbar RFA in January of this year, initially appreciating good improvement in her pain though this wore off after a month. Her low back pain is moderate to severe in intensity. At her last visit with pain management she was started on Norco 5/325 mg once daily. This is not providing benefit. Her PCP started low dose Gabapentin 100 mg TID and duloxetine. She is unsure these medications are providing benefit.      Her pain is primarily in the low back. She denies any radiating pain into the legs. Denies weakness into the legs. Denies problems with bowel or bladder.  "    Recent Interventions: lumbar RFA. Norco.       Review of Systems   Musculoskeletal:  Positive for arthralgias, back pain, gait problem and myalgias.   Neurological:  Positive for weakness and numbness.   All other systems reviewed and are negative.       Objective   Vital Signs:   /61 (BP Location: Left arm, Patient Position: Sitting, Cuff Size: Large Adult)   Pulse 73   Ht 168.9 cm (66.5\")   Wt 96.7 kg (213 lb 1.6 oz)   BMI 33.88 kg/m²       Physical Exam  Vitals reviewed.   Constitutional:       Appearance: Normal appearance.   Musculoskeletal:      Lumbar back: No tenderness. Negative right straight leg raise test and negative left straight leg raise test.      Right hip: No tenderness. Normal range of motion.      Left hip: No tenderness. Normal range of motion.   Neurological:      Mental Status: She is alert and oriented to person, place, and time.      Motor: Motor strength is normal.     Gait: Gait is intact.      Deep Tendon Reflexes:      Reflex Scores:       Patellar reflexes are 0 on the right side and 0 on the left side.       Achilles reflexes are 0 on the right side and 0 on the left side.       Neurologic Exam     Mental Status   Oriented to person, place, and time.   Level of consciousness: alert    Motor Exam   Muscle bulk: normal  Overall muscle tone: normal    Strength   Strength 5/5 throughout.     Sensory Exam   Sensory deficit distribution on right: L5  Sensory deficit distribution on left: L5    Gait, Coordination, and Reflexes     Gait  Gait: normal    Reflexes   Right patellar: 0  Left patellar: 0  Right achilles: 0  Left achilles: 0  Right ankle clonus: absent  Left ankle clonus: absent       Result Review :       Data reviewed : Radiologic studies MRI Lumbar Spine 12/21/23 at [facility] personally reviewed. Multilevel spondylosis. Severe spinal stenosis at L3/4 and L4/5. At L5/S1 there is a right lateral disc extrusion, with severe right foraminal narrowing, moderate spinal " stenosis.This has progressed since her last MRI in 2021.        MRI Lumbar Spine at [facility] in August 2021 personally reviewed, revealing multilevel spondylosis with severe stenosis at L4/5, moderate stenosis at L3/4.        Assessment and Plan    Diagnoses and all orders for this visit:    1. Osteoarthritis of spine with radiculopathy, lumbosacral region (Primary)  -     Case Request; Standing  -     Follow Anesthesia Guidelines / Protocol; Standing  -     Obtain informed consent; Standing  -     SCD (sequential compression device)- to be placed on patient in Pre-op; Standing  -     Verify / Perform Chlorhexidine Skin Prep; Standing  -     clindamycin (CLEOCIN) 900 mg in dextrose (D5W) 5 % 100 mL IVPB  -     Case Request    2. Spinal stenosis, lumbar region, without neurogenic claudication  -     Case Request; Standing  -     Follow Anesthesia Guidelines / Protocol; Standing  -     Obtain informed consent; Standing  -     SCD (sequential compression device)- to be placed on patient in Pre-op; Standing  -     Verify / Perform Chlorhexidine Skin Prep; Standing  -     clindamycin (CLEOCIN) 900 mg in dextrose (D5W) 5 % 100 mL IVPB  -     Case Request    Pt presenting with worsening right leg pain. We reviewed her MRI Lumbar Spine, demonstrating progression in spinal stenosis at L3/4 and L4/5. Dr. Moss discussed right approach minimally invasive laminectomy at L3/4 and L4/5. Risks and benefits discussed. Expected recovery and mobility restrictions discussed. Pt wishes to proceed.    Follow up at post op.     I spent 40 minutes caring for Celina on this date of service. This time includes time spent by me in the following activities:preparing for the visit, reviewing tests, obtaining and/or reviewing a separately obtained history, performing a medically appropriate examination and/or evaluation , counseling and educating the patient/family/caregiver, ordering medications, tests, or procedures, documenting  information in the medical record, independently interpreting results and communicating that information with the patient/family/caregiver, and care coordination.    Follow Up   No follow-ups on file.  Patient was given instructions and counseling regarding her condition or for health maintenance advice.

## 2024-10-17 ENCOUNTER — TRANSCRIBE ORDERS (OUTPATIENT)
Dept: ADMINISTRATIVE | Facility: HOSPITAL | Age: 79
End: 2024-10-17
Payer: MEDICARE

## 2024-10-17 DIAGNOSIS — N95.9 MENOPAUSAL AND POSTMENOPAUSAL DISORDER: Primary | ICD-10-CM

## 2024-10-17 DIAGNOSIS — M43.16 SPONDYLOLISTHESIS OF LUMBAR REGION: Primary | ICD-10-CM

## 2024-11-22 ENCOUNTER — HOSPITAL ENCOUNTER (OUTPATIENT)
Dept: MRI IMAGING | Facility: HOSPITAL | Age: 79
Discharge: HOME | End: 2024-11-22
Payer: MEDICARE

## 2024-11-22 ENCOUNTER — HOSPITAL ENCOUNTER (OUTPATIENT)
Dept: BONE DENSITY | Facility: HOSPITAL | Age: 79
Discharge: HOME | End: 2024-11-22
Payer: MEDICARE

## 2024-11-22 DIAGNOSIS — M43.16 SPONDYLOLISTHESIS OF LUMBAR REGION: ICD-10-CM

## 2024-11-22 DIAGNOSIS — N95.9 MENOPAUSAL AND POSTMENOPAUSAL DISORDER: ICD-10-CM

## 2024-11-22 PROCEDURE — 72148 MRI LUMBAR SPINE W/O DYE: CPT

## 2024-11-22 PROCEDURE — 77080 DXA BONE DENSITY AXIAL: CPT

## 2025-03-10 ENCOUNTER — TRANSCRIBE ORDERS (OUTPATIENT)
Dept: ADMINISTRATIVE | Facility: HOSPITAL | Age: 80
End: 2025-03-10
Payer: MEDICARE

## 2025-03-10 DIAGNOSIS — R41.3 MEMORY LOSS: Primary | ICD-10-CM

## 2025-04-09 ENCOUNTER — HOSPITAL ENCOUNTER (OUTPATIENT)
Dept: MRI IMAGING | Facility: HOSPITAL | Age: 80
Discharge: HOME | End: 2025-04-09
Payer: MEDICARE

## 2025-04-09 ENCOUNTER — TRANSCRIBE ORDERS (OUTPATIENT)
Dept: ADMINISTRATIVE | Facility: HOSPITAL | Age: 80
End: 2025-04-09
Payer: MEDICARE

## 2025-04-09 ENCOUNTER — HOSPITAL ENCOUNTER (OUTPATIENT)
Dept: GENERAL RADIOLOGY | Facility: HOSPITAL | Age: 80
Discharge: HOME | End: 2025-04-09
Payer: MEDICARE

## 2025-04-09 DIAGNOSIS — M25.552 LEFT HIP PAIN: ICD-10-CM

## 2025-04-09 DIAGNOSIS — R41.3 MEMORY LOSS: ICD-10-CM

## 2025-04-09 DIAGNOSIS — M25.552 LEFT HIP PAIN: Primary | ICD-10-CM

## 2025-04-09 LAB
CREAT BLDA-MCNC: 1.1 MG/DL (ref 0.6–1.3)
EGFRCR SERPLBLD CKD-EPI 2021: 51.9 ML/MIN/1.73

## 2025-04-09 PROCEDURE — 73502 X-RAY EXAM HIP UNI 2-3 VIEWS: CPT

## 2025-04-09 PROCEDURE — 70553 MRI BRAIN STEM W/O & W/DYE: CPT

## 2025-04-09 PROCEDURE — A9577 INJ MULTIHANCE: HCPCS | Performed by: FAMILY MEDICINE

## 2025-04-09 PROCEDURE — 82565 ASSAY OF CREATININE: CPT

## 2025-04-09 PROCEDURE — 25510000002 GADOBENATE DIMEGLUMINE 529 MG/ML SOLUTION: Performed by: FAMILY MEDICINE

## 2025-04-09 RX ADMIN — GADOBENATE DIMEGLUMINE 20 ML: 529 INJECTION, SOLUTION INTRAVENOUS at 15:46
